# Patient Record
Sex: MALE | Race: WHITE | NOT HISPANIC OR LATINO | Employment: UNEMPLOYED | URBAN - NONMETROPOLITAN AREA
[De-identification: names, ages, dates, MRNs, and addresses within clinical notes are randomized per-mention and may not be internally consistent; named-entity substitution may affect disease eponyms.]

---

## 2017-03-13 ENCOUNTER — OFFICE VISIT (OUTPATIENT)
Dept: CARDIOLOGY | Facility: CLINIC | Age: 37
End: 2017-03-13

## 2017-03-13 VITALS
WEIGHT: 170 LBS | HEIGHT: 67 IN | SYSTOLIC BLOOD PRESSURE: 141 MMHG | OXYGEN SATURATION: 100 % | BODY MASS INDEX: 26.68 KG/M2 | HEART RATE: 41 BPM | DIASTOLIC BLOOD PRESSURE: 74 MMHG

## 2017-03-13 DIAGNOSIS — R94.31 ABNORMAL EKG: ICD-10-CM

## 2017-03-13 DIAGNOSIS — R06.02 SHORTNESS OF BREATH: Primary | ICD-10-CM

## 2017-03-13 DIAGNOSIS — R01.1 SYSTOLIC MURMUR: ICD-10-CM

## 2017-03-13 DIAGNOSIS — R01.1 HEART MURMUR: ICD-10-CM

## 2017-03-13 DIAGNOSIS — H02.539 LID LAG: ICD-10-CM

## 2017-03-13 DIAGNOSIS — R00.1 BRADYCARDIA: ICD-10-CM

## 2017-03-13 DIAGNOSIS — R42 DIZZINESS: ICD-10-CM

## 2017-03-13 PROCEDURE — 93000 ELECTROCARDIOGRAM COMPLETE: CPT | Performed by: INTERNAL MEDICINE

## 2017-03-13 PROCEDURE — 99204 OFFICE O/P NEW MOD 45 MIN: CPT | Performed by: INTERNAL MEDICINE

## 2017-03-13 NOTE — PROGRESS NOTES
"Subjective   Dimitri Natarajan is a 36 y.o. male     Chief Complaint   Patient presents with   • Slow Heart Rate     presents as a new patient       PROBLEM LIST:     1. Bradycardia   2. Dizziness   3. Heart murmur     Specialty Problems     None            HPI:  Mr. Dimitri Natarajan is a 36-year-old white male referred by Shannan Rose for evaluation of bradycardia.    Mr. Natarajan first noted bradycardia proximal knee 3 years ago.  At that time he began to restrict calories and lose weight.  When he first started exercising would palpitate when he would walk up hill and do other high levels of physical activity.  He is had no palpitations over the last year.  Mr. Natarajan works out at the gym 6 days a week and only occasionally notices dizziness.  This is usually associated with rapid movements and not necessarily with high-level aerobic exercise.  He has had no presyncope or syncope.  The patient does describe mild to moderate orthostatic lightheadedness.    Mr. Natarajan denies any type of chest discomfort.  He relates no orthopnea, PND, or edema.  He doesn't claudicate or describe other symptoms of peripheral arterial disease or arterial embolic events.  There are multiple members and the patient's family who have had abnormal rhythms but one has required a pacemaker.  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                 CURRENT MEDICATION:    No current outpatient prescriptions on file.     No current facility-administered medications for this visit.        ALLERGIES:    Review of patient's allergies indicates no known allergies.    PAST MEDICAL HISTORY:    Past Medical History   Diagnosis Date   • Heart murmur        SURGICAL HISTORY:    Past Surgical History   Procedure Laterality Date   • Rotator cuff repair     • Shoulder surgery         SOCIAL HISTORY:    Social History     Social History   • Marital status: Single     Spouse name: N/A   • Number " "of children: N/A   • Years of education: N/A     Occupational History   • Not on file.     Social History Main Topics   • Smoking status: Former Smoker   • Smokeless tobacco: Never Used   • Alcohol use No   • Drug use: No   • Sexual activity: Defer     Other Topics Concern   • Not on file     Social History Narrative   • No narrative on file       FAMILY HISTORY:    Family History   Problem Relation Age of Onset   • Heart disease Mother    • Heart disease Father        Review of Systems   Constitutional: Negative.  Negative for chills, diaphoresis, fatigue and fever.   HENT: Positive for ear pain and sinus pressure.    Eyes: Positive for visual disturbance (wears glasses).   Respiratory: Negative.  Negative for chest tightness and shortness of breath (rare no orthopnea or PND).    Cardiovascular: Negative.  Negative for chest pain, palpitations and leg swelling.   Gastrointestinal: Negative.  Negative for abdominal pain, blood in stool (no melena, no hematuria, no hematemesis, no hematochezia ), constipation, diarrhea, nausea and vomiting.   Endocrine: Negative.  Negative for cold intolerance and heat intolerance.   Genitourinary: Negative.    Musculoskeletal: Positive for arthralgias. Negative for myalgias.   Skin: Negative.    Allergic/Immunologic: Positive for environmental allergies.   Neurological: Positive for dizziness (with exercise movement , occasionally ) and headaches (with moderate activity, rare). Negative for tremors, seizures, syncope, facial asymmetry, speech difficulty, weakness, light-headedness and numbness.   Hematological: Negative.    Psychiatric/Behavioral: Negative.        VISIT VITALS:    Visit Vitals   • /74 (BP Location: Left arm, Patient Position: Sitting)   • Pulse (!) 41   • Ht 67\" (170.2 cm)   • Wt 170 lb (77.1 kg)   • SpO2 100%   • BMI 26.63 kg/m2       RECENT LABS:    Objective       Physical Exam   Constitutional: He is oriented to person, place, and time. He appears " well-developed and well-nourished. No distress.   HENT:   Head: Normocephalic and atraumatic.   Eyes: Conjunctivae, EOM and lids are normal. Pupils are equal, round, and reactive to light. Lids are everted and swept, no foreign bodies found.   Minor lid lag   Negative ptosis BL   Negative arcus senilis   Negative nystagmus      Neck: Normal range of motion. Neck supple. Normal carotid pulses, no hepatojugular reflux and no JVD present. Carotid bruit is not present. No tracheal deviation present. No thyroid mass and no thyromegaly present.   Cardiovascular:   Murmur heard.   Systolic (RUSB radiates to apex ) murmur is present with a grade of 2/6   Pulses:       Radial pulses are 2+ on the right side, and 2+ on the left side.        Dorsalis pedis pulses are 2+ on the right side, and 2+ on the left side.        Posterior tibial pulses are 2+ on the right side, and 2+ on the left side.   Pulmonary/Chest: Effort normal and breath sounds normal. No respiratory distress. He has no wheezes. He has no rales. He exhibits no tenderness.   Abdominal: Soft. Bowel sounds are normal. He exhibits no distension, no abdominal bruit and no mass. There is no tenderness.   Negative organomegaly      Musculoskeletal: Normal range of motion. He exhibits no edema.   Neurological: He is alert and oriented to person, place, and time. He has normal reflexes.   Skin: Skin is warm and dry. He is not diaphoretic.   Psychiatric: He has a normal mood and affect. His speech is normal and behavior is normal. Judgment and thought content normal. Cognition and memory are normal.   Nursing note and vitals reviewed.        ECG 12 Lead  Date/Time: 3/13/2017 11:05 AM  Performed by: KEL GERARDO  Authorized by: KEL GERARDO   Rhythm: sinus bradycardia  Rate: bradycardic  Conduction: 1st degree  QRS axis: normal  Comments: Chest pain  Palpitations  Shortness of breath                Assessment/Plan   #1.  Profound sinus bradycardia which is very  minimally symptomatic.  I wonder if this could be a manifestation of the patient's weight loss and reduced cardiac work load.  However, primary rhythm disturbance other potential causes need to be evaluated further.    #2.  Therefore, I would like to have the patient wear a 30 day event monitor to assess for excursions of bradycardia dysrhythmia.  We will also perform treadmill stress testing to evaluate the possibility of chronotropic incompetence.    #3.  Will perform echocardiography to assess the patient's murmur and history of septal defect.    #4.  I would like to get thyroid function studies based on the patient's bradycardia and first-degree AV block.    #5 sedition will follow-up with Jordon Rose as instructed, and in our office after testing her on a when necessary basis for symptoms as discussed.               Diagnosis Plan   1. Shortness of breath  ECG 12 Lead   2. Bradycardia  Adult Transthoracic Echo Complete    Treadmill Stress Test    Cardiac Event Monitor    Cardiac Event Monitor    Treadmill Stress Test    Adult Transthoracic Echo Complete   3. Lid lag  TSH    T3, Free    T4, Free    TSH    T3, Free    T4, Free   4. Abnormal EKG  Adult Transthoracic Echo Complete    Treadmill Stress Test    Cardiac Event Monitor    Cardiac Event Monitor    Treadmill Stress Test    Adult Transthoracic Echo Complete   5. Dizziness  Adult Transthoracic Echo Complete    Treadmill Stress Test    Treadmill Stress Test    Adult Transthoracic Echo Complete   6. Heart murmur  Adult Transthoracic Echo Complete    Treadmill Stress Test    Treadmill Stress Test    Adult Transthoracic Echo Complete   7. Systolic murmur  Adult Transthoracic Echo Complete    Treadmill Stress Test    Treadmill Stress Test    Adult Transthoracic Echo Complete       Return for f/u after stress, echo, event, labs .         Chin Che MD

## 2017-03-22 ENCOUNTER — OFFICE VISIT (OUTPATIENT)
Dept: CARDIOLOGY | Facility: CLINIC | Age: 37
End: 2017-03-22

## 2017-03-22 VITALS
BODY MASS INDEX: 25.43 KG/M2 | HEART RATE: 60 BPM | HEIGHT: 67 IN | WEIGHT: 162 LBS | OXYGEN SATURATION: 99 % | SYSTOLIC BLOOD PRESSURE: 108 MMHG | DIASTOLIC BLOOD PRESSURE: 71 MMHG

## 2017-03-22 DIAGNOSIS — R00.2 PALPITATIONS: ICD-10-CM

## 2017-03-22 DIAGNOSIS — R00.1 BRADYCARDIA: ICD-10-CM

## 2017-03-22 DIAGNOSIS — R06.02 SHORTNESS OF BREATH: Primary | ICD-10-CM

## 2017-03-22 PROCEDURE — 99213 OFFICE O/P EST LOW 20 MIN: CPT | Performed by: PHYSICIAN ASSISTANT

## 2017-03-22 RX ORDER — FLUTICASONE PROPIONATE 50 MCG
2 SPRAY, SUSPENSION (ML) NASAL DAILY
COMMUNITY

## 2017-03-22 NOTE — PROGRESS NOTES
Problem list     Subjective   Dimitri Natarajan is a 36 y.o. male     Chief Complaint   Patient presents with   • Follow-up     heart monitor       HPI    Problem list  1. Patient is a 36-year-old male that presents back to office for follow-up. He was seen last office visit by Dr. Che who schedule for an ischemia assessment as well as event monitoring. Patient was recently diagnosed with influenza. He did not perform stress testing or echocardiogram. However he is currently wearing event monitoring and is here today to follow-up because of a serious report per the company.    He has been feeling well. He denies any chest pain or pressure. He has mild shortness of breath without levels of activity but nothing that has been progressive. He denies PND orthopnea. He has had palpitations in the past but nothing recent. He denies dizziness, presyncope, or syncope. Otherwise states is doing well    Event monitor shows episodes of sinus bradycardia in the 30s even during daytime hours but patient was not symptomatic in any regard.        Outpatient Encounter Prescriptions as of 3/22/2017   Medication Sig Dispense Refill   • fluticasone (FLONASE) 50 MCG/ACT nasal spray 2 sprays into each nostril Daily.     • Oseltamivir Phosphate (TAMIFLU PO) Take  by mouth. Until completed       No facility-administered encounter medications on file as of 3/22/2017.        Review of patient's allergies indicates no known allergies.    Past Medical History:   Diagnosis Date   • Heart murmur        Social History     Social History   • Marital status: Single     Spouse name: N/A   • Number of children: N/A   • Years of education: N/A     Occupational History   • Not on file.     Social History Main Topics   • Smoking status: Former Smoker   • Smokeless tobacco: Never Used   • Alcohol use No   • Drug use: No   • Sexual activity: Defer     Other Topics Concern   • Not on file     Social History Narrative       Family History   Problem  "Relation Age of Onset   • Heart disease Mother    • Heart disease Father        Review of Systems   Constitutional: Positive for fatigue.   HENT: Positive for congestion (nasal congestion) and postnasal drip.         Currently has flu   Eyes: Positive for visual disturbance (wears glasses).   Respiratory: Positive for shortness of breath.    Cardiovascular: Negative.    Gastrointestinal: Negative.    Endocrine: Negative.    Genitourinary: Negative.    Musculoskeletal: Positive for arthralgias and myalgias.   Skin: Negative.    Allergic/Immunologic: Negative.    Neurological: Positive for dizziness (currently with flu).   Hematological: Negative.    Psychiatric/Behavioral: Negative.        Objective     /71 (BP Location: Left arm, Patient Position: Sitting)  Pulse 60  Ht 67\" (170.2 cm)  Wt 162 lb (73.5 kg)  SpO2 99%  BMI 25.37 kg/m2    Lab Results (most recent)     None          Physical Exam   Constitutional: He is oriented to person, place, and time. He appears well-developed and well-nourished. No distress.   HENT:   Head: Normocephalic and atraumatic.   Eyes: EOM are normal. Pupils are equal, round, and reactive to light.   Neck: No JVD present.   Cardiovascular: Normal rate, regular rhythm and normal heart sounds.  Exam reveals no gallop and no friction rub.    No murmur heard.  Pulmonary/Chest: Effort normal and breath sounds normal. No respiratory distress. He has no wheezes. He has no rales. He exhibits no tenderness.   Musculoskeletal: Normal range of motion. He exhibits no edema.   Neurological: He is alert and oriented to person, place, and time. No cranial nerve deficit.   Skin: Skin is warm and dry. No rash noted. No erythema. No pallor.   Psychiatric: He has a normal mood and affect. His behavior is normal.   Nursing note and vitals reviewed.      Procedure   Procedures       Assessment/Plan     Problems Addressed this Visit        Cardiovascular and Mediastinum    Bradycardia    Palpitations "       Respiratory    Shortness of breath - Primary               recommendations  1. Patient to follow through with stress testing and echocardiogram for Dr. Che. We will continue to monitor clinically and patient will continue to wear an event monitor. As of now, he exhibits no symptoms to suggest cerebral hypoperfusion. We will see him back for follow-up after above testing. Follow-up primary as scheduled

## 2017-03-29 ENCOUNTER — HOSPITAL ENCOUNTER (OUTPATIENT)
Dept: CARDIOLOGY | Facility: HOSPITAL | Age: 37
Discharge: HOME | End: 2017-03-29
Attending: INTERNAL MEDICINE | Admitting: INTERNAL MEDICINE

## 2017-03-29 ENCOUNTER — OUTSIDE FACILITY SERVICE (OUTPATIENT)
Dept: CARDIOLOGY | Facility: CLINIC | Age: 37
End: 2017-03-29

## 2017-03-29 ENCOUNTER — HOSPITAL ENCOUNTER (OUTPATIENT)
Dept: CARDIOLOGY | Facility: HOSPITAL | Age: 37
Discharge: HOME | End: 2017-03-29
Attending: INTERNAL MEDICINE

## 2017-03-29 LAB
MAXIMAL PREDICTED HEART RATE: 182 BPM
STRESS TARGET HR: 155 BPM

## 2017-03-29 PROCEDURE — 93017 CV STRESS TEST TRACING ONLY: CPT

## 2017-03-29 PROCEDURE — 93306 TTE W/DOPPLER COMPLETE: CPT | Performed by: INTERNAL MEDICINE

## 2017-03-29 PROCEDURE — 93306 TTE W/DOPPLER COMPLETE: CPT

## 2017-03-29 PROCEDURE — 93018 CV STRESS TEST I&R ONLY: CPT | Performed by: INTERNAL MEDICINE

## 2017-04-13 ENCOUNTER — DOCUMENTATION (OUTPATIENT)
Dept: CARDIOLOGY | Facility: CLINIC | Age: 37
End: 2017-04-13

## 2017-07-03 ENCOUNTER — OFFICE VISIT (OUTPATIENT)
Dept: CARDIOLOGY | Facility: CLINIC | Age: 37
End: 2017-07-03

## 2017-07-03 VITALS
OXYGEN SATURATION: 99 % | HEIGHT: 67 IN | SYSTOLIC BLOOD PRESSURE: 133 MMHG | DIASTOLIC BLOOD PRESSURE: 75 MMHG | BODY MASS INDEX: 26.59 KG/M2 | WEIGHT: 169.4 LBS | HEART RATE: 43 BPM

## 2017-07-03 DIAGNOSIS — R06.02 SHORTNESS OF BREATH: Primary | ICD-10-CM

## 2017-07-03 DIAGNOSIS — R00.1 BRADYCARDIA: ICD-10-CM

## 2017-07-03 DIAGNOSIS — I36.1 NON-RHEUMATIC TRICUSPID VALVE INSUFFICIENCY: ICD-10-CM

## 2017-07-03 PROCEDURE — 99213 OFFICE O/P EST LOW 20 MIN: CPT | Performed by: PHYSICIAN ASSISTANT

## 2017-07-03 RX ORDER — IBUPROFEN 800 MG/1
TABLET ORAL AS NEEDED
COMMUNITY
Start: 2017-06-10

## 2017-07-03 NOTE — PROGRESS NOTES
Problem list     Subjective   Dimitri Natarajan is a 36 y.o. male     Chief Complaint   Patient presents with   • Follow-up     patient appears inoffice today for follow up with stress test results       HPI    Patient is a 36-year-old male that presents back for follow-up. He has done remarkably well. He denies chest pain or pressure. This very minimal dyspnea with high levels of activity but none at baseline. Denies PND orthopnea. He does not palpitate or dysrhythmic symptoms. Patient is quite active. Patient has lost approximately 150 pounds and is very active lifting weights at this time with no symptoms.    Stress test indicated patient exercising 9 minutes on a Pablo protocol with no symptoms or EKG changes and was considered low risk    Echocardiogram was largely unremarkable. Normal systolic function, mild tricuspid regurgitation otherwise normal      Outpatient Encounter Prescriptions as of 7/3/2017   Medication Sig Dispense Refill   • fluticasone (FLONASE) 50 MCG/ACT nasal spray 2 sprays into each nostril Daily.     • ibuprofen (ADVIL,MOTRIN) 800 MG tablet Take As Directed.     • Oseltamivir Phosphate (TAMIFLU PO) Take  by mouth. Until completed       No facility-administered encounter medications on file as of 7/3/2017.        Review of patient's allergies indicates no known allergies.    Past Medical History:   Diagnosis Date   • Heart murmur        Social History     Social History   • Marital status: Single     Spouse name: N/A   • Number of children: N/A   • Years of education: N/A     Occupational History   • Not on file.     Social History Main Topics   • Smoking status: Former Smoker   • Smokeless tobacco: Never Used   • Alcohol use No   • Drug use: No   • Sexual activity: Defer     Other Topics Concern   • Not on file     Social History Narrative       Family History   Problem Relation Age of Onset   • Heart disease Mother    • Heart disease Father        Review of Systems   Constitutional: Negative.  " Negative for fatigue.   HENT: Negative.    Eyes: Positive for visual disturbance (wears glasses daily).   Respiratory: Negative.  Negative for cough, chest tightness, shortness of breath and wheezing.    Cardiovascular: Negative.  Negative for chest pain, palpitations and leg swelling.   Gastrointestinal: Negative.  Negative for abdominal pain, nausea and vomiting.   Endocrine: Negative.  Negative for cold intolerance, heat intolerance, polyphagia and polyuria.   Genitourinary: Negative.  Negative for difficulty urinating, frequency and urgency.   Musculoskeletal: Positive for arthralgias (shoulders/back) and back pain (due to arthritis). Negative for myalgias, neck pain and neck stiffness.   Skin: Negative.  Negative for rash and wound.   Allergic/Immunologic: Negative.  Negative for environmental allergies and food allergies.   Neurological: Negative.  Negative for dizziness, weakness, light-headedness and headaches.   Hematological: Negative.  Does not bruise/bleed easily.   Psychiatric/Behavioral: Negative.  Negative for agitation and confusion. The patient is not nervous/anxious.        Objective     /75 (BP Location: Left arm, Patient Position: Sitting)  Pulse (!) 43  Ht 67\" (170.2 cm)  Wt 169 lb 6.4 oz (76.8 kg)  SpO2 99%  BMI 26.53 kg/m2    Lab Results (most recent)     None          Physical Exam    Procedure   Procedures       Assessment/Plan     Problems Addressed this Visit        Cardiovascular and Mediastinum    Bradycardia    Non-rheumatic tricuspid valve insufficiency       Respiratory    Shortness of breath - Primary              Recommendation  1. Patient doing well at this time. Although event monitor demonstrates bradycardia patient is not symptomatic and is quite active. Stress and echo unremarkable. We will see back follow-up in one year or sooner symptoms discussed. Follow-up primary as scheduled     "

## 2020-01-09 ENCOUNTER — OFFICE VISIT (OUTPATIENT)
Dept: CARDIOLOGY | Facility: CLINIC | Age: 40
End: 2020-01-09

## 2020-01-09 VITALS
HEART RATE: 60 BPM | OXYGEN SATURATION: 99 % | SYSTOLIC BLOOD PRESSURE: 137 MMHG | BODY MASS INDEX: 28.25 KG/M2 | WEIGHT: 180 LBS | DIASTOLIC BLOOD PRESSURE: 81 MMHG | HEIGHT: 67 IN

## 2020-01-09 DIAGNOSIS — R20.0 NUMBNESS: Primary | ICD-10-CM

## 2020-01-09 DIAGNOSIS — R53.1 WEAKNESS: ICD-10-CM

## 2020-01-09 DIAGNOSIS — R00.1 BRADYCARDIA, SINUS: ICD-10-CM

## 2020-01-09 DIAGNOSIS — R09.89 CAROTID BRUIT, UNSPECIFIED LATERALITY: ICD-10-CM

## 2020-01-09 PROCEDURE — 99214 OFFICE O/P EST MOD 30 MIN: CPT | Performed by: PHYSICIAN ASSISTANT

## 2020-01-09 PROCEDURE — 93000 ELECTROCARDIOGRAM COMPLETE: CPT | Performed by: PHYSICIAN ASSISTANT

## 2020-01-09 RX ORDER — CETIRIZINE HYDROCHLORIDE 10 MG/1
10 TABLET ORAL DAILY
COMMUNITY

## 2020-01-09 NOTE — PROGRESS NOTES
Problem list     Subjective   Dimitri Natarajan is a 39 y.o. male     Chief Complaint   Patient presents with   • Numbness     Here for a follow up       HPI    Patient is a 39-year-old male who presents back to the office for follow-up.  We had seen him last in 2017.  He underwent cardiac evaluation including stress test and echo which was normal.    Recently he started having symptoms of numbness.  Patient describes that he was sitting and noticed numbness in his face and head.  Patient began to lean forward and eventually resolved.  He was concerned about possible stroke.  Patient has spinal issues and has had numbness in his arms and legs before related to radiculopathy but he was concerned and his chiropractor recommended evaluation.    He does not describe chest pain.  He has no shortness of breath.  No PND orthopnea.    He does have baseline bradycardia.  This is demonstrated today but otherwise he is doing well.      Current Outpatient Medications on File Prior to Visit   Medication Sig Dispense Refill   • cetirizine (zyrTEC) 10 MG tablet Take 10 mg by mouth Daily.     • fluticasone (FLONASE) 50 MCG/ACT nasal spray 2 sprays into each nostril Daily.     • ibuprofen (ADVIL,MOTRIN) 800 MG tablet Take As Directed.     • Oseltamivir Phosphate (TAMIFLU PO) Take  by mouth. Until completed       No current facility-administered medications on file prior to visit.        Patient has no known allergies.    Past Medical History:   Diagnosis Date   • Heart murmur        Social History     Socioeconomic History   • Marital status: Single     Spouse name: Not on file   • Number of children: Not on file   • Years of education: Not on file   • Highest education level: Not on file   Tobacco Use   • Smoking status: Former Smoker   • Smokeless tobacco: Never Used   Substance and Sexual Activity   • Alcohol use: No   • Drug use: No   • Sexual activity: Defer       Family History   Problem Relation Age of Onset   • Heart disease  "Mother    • Heart disease Father        Review of Systems   Constitutional: Positive for fatigue. Negative for chills and fever.   HENT: Positive for congestion (stuffy nose ).    Eyes: Positive for visual disturbance (glasses daily ).   Respiratory: Negative.  Negative for chest tightness and shortness of breath.    Cardiovascular: Negative.  Negative for chest pain, palpitations and leg swelling.   Gastrointestinal: Negative.    Endocrine: Negative.  Negative for cold intolerance and heat intolerance.   Genitourinary: Negative.    Musculoskeletal: Negative.  Negative for arthralgias and back pain.   Skin: Negative.  Negative for rash and wound.   Allergic/Immunologic: Negative.  Negative for environmental allergies and food allergies.   Neurological: Positive for light-headedness (when standing too fast ) and numbness (some numbness and tingling in left side of face a couple of weeks ago. Nothing since then ). Negative for dizziness and weakness.        Feels like he is in a fog at times recently    Hematological: Negative.  Does not bruise/bleed easily.   Psychiatric/Behavioral: Negative.  Negative for sleep disturbance (denies waking with smothering ).   All other systems reviewed and are negative.      Objective   Vitals:    01/09/20 1307   BP: 137/81   BP Location: Left arm   Patient Position: Sitting   Pulse: 60   SpO2: 99%   Weight: 81.6 kg (180 lb)   Height: 170.2 cm (67\")      /81 (BP Location: Left arm, Patient Position: Sitting)   Pulse 60   Ht 170.2 cm (67\")   Wt 81.6 kg (180 lb)   SpO2 99%   BMI 28.19 kg/m²     Lab Results (most recent)     None          Physical Exam   Constitutional: He is oriented to person, place, and time. He appears well-developed and well-nourished. No distress.   HENT:   Head: Normocephalic and atraumatic.   Eyes: Pupils are equal, round, and reactive to light. EOM are normal.   Neck: No JVD present.   Cardiovascular: Normal rate, regular rhythm, normal heart sounds " and intact distal pulses. Exam reveals no gallop and no friction rub.   No murmur heard.  Pulmonary/Chest: Effort normal and breath sounds normal. No respiratory distress. He has no wheezes. He has no rales. He exhibits no tenderness.   Musculoskeletal: Normal range of motion. He exhibits no edema.   Neurological: He is alert and oriented to person, place, and time. No cranial nerve deficit.   Skin: Skin is warm and dry. No rash noted. No erythema. No pallor.   Psychiatric: He has a normal mood and affect. His behavior is normal.   Nursing note and vitals reviewed.      Procedure     ECG 12 Lead  Date/Time: 1/9/2020 1:16 PM  Performed by: Preston Farley PA  Authorized by: Preston Farley PA   Comparison: compared with previous ECG from 3/13/2017  Comments: EKG is sinus rhythm at 54 bpm with no acute ST changes               Assessment/Plan     Problems Addressed this Visit        Cardiovascular and Mediastinum    Bradycardia, sinus    Relevant Orders    Cardiac Event Monitor       Nervous and Auditory    Numbness - Primary    Relevant Orders    ECG 12 Lead    Cardiac Event Monitor       Other    Weakness    Relevant Orders    Cardiac Event Monitor      Other Visit Diagnoses     Carotid bruit, unspecified laterality        Relevant Orders    Duplex Carotid Ultrasound CAR    Cardiac Event Monitor            Recommendation  1.  Patient doing well at this time.  No symptoms of angina or failure.  2.  Patient with bradycardia and numbness on the left side of his face.  I will schedule event monitor to rule out any arrhythmic substrate.  3.  Carotid duplex just to ensure no abnormality.  We will see him back for follow-up after testing.  Will follow with primary as scheduled       Patient's Body mass index is 28.19 kg/m². BMI is within normal parameters. No follow-up required..       Electronically signed by:

## 2020-01-15 ENCOUNTER — HOSPITAL ENCOUNTER (OUTPATIENT)
Dept: CARDIOLOGY | Facility: HOSPITAL | Age: 40
Discharge: HOME | End: 2020-01-15
Admitting: PHYSICIAN ASSISTANT

## 2020-01-15 DIAGNOSIS — R09.89 CAROTID BRUIT, UNSPECIFIED LATERALITY: ICD-10-CM

## 2020-01-15 PROCEDURE — 93880 EXTRACRANIAL BILAT STUDY: CPT | Performed by: INTERNAL MEDICINE

## 2020-01-15 PROCEDURE — 93880 EXTRACRANIAL BILAT STUDY: CPT

## 2020-01-27 ENCOUNTER — TELEPHONE (OUTPATIENT)
Dept: CARDIOLOGY | Facility: CLINIC | Age: 40
End: 2020-01-27

## 2020-01-27 LAB
BH CV ECHO MEAS - BSA(HAYCOCK): 2 M^2
BH CV ECHO MEAS - BSA: 1.9 M^2
BH CV ECHO MEAS - BZI_BMI: 28.2 KILOGRAMS/M^2
BH CV ECHO MEAS - BZI_METRIC_HEIGHT: 170.2 CM
BH CV ECHO MEAS - BZI_METRIC_WEIGHT: 81.6 KG
BH CV XLRA MEAS LEFT BULB EDV: -11.7 CM/SEC
BH CV XLRA MEAS LEFT BULB PSV: -57.1 CM/SEC
BH CV XLRA MEAS LEFT CCA RATIO VEL: 109 CM/SEC
BH CV XLRA MEAS LEFT DIST CCA EDV: 22 CM/SEC
BH CV XLRA MEAS LEFT DIST CCA PSV: 109 CM/SEC
BH CV XLRA MEAS LEFT DIST ICA EDV: -57.3 CM/SEC
BH CV XLRA MEAS LEFT DIST ICA PSV: -139 CM/SEC
BH CV XLRA MEAS LEFT ICA RATIO VEL: -138 CM/SEC
BH CV XLRA MEAS LEFT ICA/CCA RATIO: -1.3
BH CV XLRA MEAS LEFT MID ICA EDV: -64.2 CM/SEC
BH CV XLRA MEAS LEFT MID ICA PSV: -114 CM/SEC
BH CV XLRA MEAS LEFT PROX CCA EDV: 24.1 CM/SEC
BH CV XLRA MEAS LEFT PROX CCA PSV: 135 CM/SEC
BH CV XLRA MEAS LEFT PROX ECA EDV: -11.3 CM/SEC
BH CV XLRA MEAS LEFT PROX ECA PSV: -72.2 CM/SEC
BH CV XLRA MEAS LEFT PROX ICA EDV: -24.1 CM/SEC
BH CV XLRA MEAS LEFT PROX ICA PSV: -109 CM/SEC
BH CV XLRA MEAS LEFT VERTEBRAL A EDV: 10.6 CM/SEC
BH CV XLRA MEAS LEFT VERTEBRAL A PSV: 31 CM/SEC
BH CV XLRA MEAS RIGHT BULB EDV: -14.4 CM/SEC
BH CV XLRA MEAS RIGHT BULB PSV: -83.9 CM/SEC
BH CV XLRA MEAS RIGHT CCA RATIO VEL: 112 CM/SEC
BH CV XLRA MEAS RIGHT DIST CCA EDV: 21.3 CM/SEC
BH CV XLRA MEAS RIGHT DIST CCA PSV: 112 CM/SEC
BH CV XLRA MEAS RIGHT DIST ICA EDV: -41.5 CM/SEC
BH CV XLRA MEAS RIGHT DIST ICA PSV: -79.8 CM/SEC
BH CV XLRA MEAS RIGHT ICA RATIO VEL: -107 CM/SEC
BH CV XLRA MEAS RIGHT ICA/CCA RATIO: -0.96
BH CV XLRA MEAS RIGHT MID ICA EDV: -47.8 CM/SEC
BH CV XLRA MEAS RIGHT MID ICA PSV: -107.5 CM/SEC
BH CV XLRA MEAS RIGHT PROX CCA EDV: 21.5 CM/SEC
BH CV XLRA MEAS RIGHT PROX CCA PSV: 138 CM/SEC
BH CV XLRA MEAS RIGHT PROX ECA EDV: -8.3 CM/SEC
BH CV XLRA MEAS RIGHT PROX ECA PSV: -76.3 CM/SEC
BH CV XLRA MEAS RIGHT PROX ICA EDV: -30.3 CM/SEC
BH CV XLRA MEAS RIGHT PROX ICA PSV: -102 CM/SEC
BH CV XLRA MEAS RIGHT VERTEBRAL A EDV: 15.8 CM/SEC
BH CV XLRA MEAS RIGHT VERTEBRAL A PSV: 65.4 CM/SEC

## 2020-01-27 NOTE — TELEPHONE ENCOUNTER
Patient made aware of carotid US results.  Discussed provider recommendations.  Patient verbalizes understanding and is agreeable.         ----- Message from GABO Hernandez sent at 1/27/2020  8:53 AM EST -----  Routine follow up

## 2020-04-09 ENCOUNTER — OFFICE VISIT (OUTPATIENT)
Dept: CARDIOLOGY | Facility: CLINIC | Age: 40
End: 2020-04-09

## 2020-04-09 DIAGNOSIS — R00.1 BRADYCARDIA, SINUS: Primary | ICD-10-CM

## 2020-04-09 DIAGNOSIS — I65.23 BILATERAL CAROTID ARTERY STENOSIS: ICD-10-CM

## 2020-04-09 DIAGNOSIS — R42 DIZZINESS: ICD-10-CM

## 2020-04-09 PROCEDURE — 99441 PR PHYS/QHP TELEPHONE EVALUATION 5-10 MIN: CPT | Performed by: PHYSICIAN ASSISTANT

## 2020-04-09 RX ORDER — ATORVASTATIN CALCIUM 20 MG/1
20 TABLET, FILM COATED ORAL DAILY
Qty: 30 TABLET | Refills: 11 | Status: SHIPPED | OUTPATIENT
Start: 2020-04-09 | End: 2021-10-12

## 2020-04-09 NOTE — PROGRESS NOTES
You have chosen to receive care through a telephone visit today. Do you consent to use a telephone visit for your medical care today? Yes    Problem list     Subjective   Dimitri Natarajan is a 39 y.o. male     Chief Complaint   Patient presents with   • Shortness of Breath     televisit for carotid and monitor f/u       HPI    Patient is a 39-year-old male that is being interviewed telephonically due to the recent global pandemic.    He has done well.  Patient is to review event monitor carotid duplex.  Carotid duplex findings suggest 16 to 49% bilateral stenosis but no obstructive disease and antegrade vertebral flow.  Event monitor showed sinus rhythm with occasional sinus bradycardia and bradycardia in the 30s with occasional junctional rhythm at night.    He has done well.  Patient is quite active.  Patient describes losing weight from 300 pounds to approximately 180 and over a 4-year period from increased exercise and diet control.  He has done well and feeling great.    He has no chest pain or pressure.  No shortness of breath.  No PND orthopnea.    He does not palpitate.  He does not describe any dizziness presyncope or syncope.  He did have dizziness with a sinus infection but that has improved.  Otherwise is doing well      Current Outpatient Medications on File Prior to Visit   Medication Sig Dispense Refill   • cetirizine (zyrTEC) 10 MG tablet Take 10 mg by mouth Daily.     • fluticasone (FLONASE) 50 MCG/ACT nasal spray 2 sprays into each nostril Daily.     • ibuprofen (ADVIL,MOTRIN) 800 MG tablet Take As Directed.     • [DISCONTINUED] Oseltamivir Phosphate (TAMIFLU PO) Take  by mouth. Until completed       No current facility-administered medications on file prior to visit.        Patient has no known allergies.    Past Medical History:   Diagnosis Date   • Heart murmur        Social History     Socioeconomic History   • Marital status: Single     Spouse name: Not on file   • Number of children: Not on  file   • Years of education: Not on file   • Highest education level: Not on file   Tobacco Use   • Smoking status: Former Smoker   • Smokeless tobacco: Never Used   Substance and Sexual Activity   • Alcohol use: No   • Drug use: No   • Sexual activity: Defer       Family History   Problem Relation Age of Onset   • Heart disease Mother    • Heart disease Father        Review of Systems   Constitutional: Negative.    HENT: Negative.         Sinus issues   Eyes: Positive for visual disturbance.   Respiratory: Negative for shortness of breath (on exertion).    Cardiovascular: Negative.  Negative for chest pain, palpitations and leg swelling.   Gastrointestinal: Negative.    Endocrine: Negative.    Genitourinary: Negative.    Musculoskeletal: Positive for arthralgias and back pain.   Skin: Negative.    Allergic/Immunologic: Positive for environmental allergies.   Neurological: Negative.    Hematological: Negative.    Psychiatric/Behavioral: Negative.    All other systems reviewed and are negative.      Objective   There were no vitals filed for this visit.   There were no vitals taken for this visit.    Lab Results (most recent)     None          Procedure   Procedures       Assessment/Plan     Problems Addressed this Visit        Cardiovascular and Mediastinum    Bradycardia, sinus - Primary    Bilateral carotid artery stenosis       Other    Dizziness            Recommendation  1.  Patient was sinus bradycardia at night but doing well.  He is in excellent physical condition some of his bradycardia may be suggestive of good conditioning.  He has no symptoms of cerebral hypoperfusion    2.  Bilateral carotid artery stenosis that is mild.  I am sending in cholesterol medication to help even if his cholesterol levels are normal and I have discussed that with him.  We will consider repeat lipid parameters    3.  Patient with dizziness at times but that has improved after resolution of sinus infection.  For now we will see  him back in a year.  Will follow with primary scheduled            This visit has been rescheduled as a phone visit to comply with patient safety concerns in accordance with CDC recommendations. Total time of discussion was 6 minutes.    Electronically signed by:

## 2020-10-12 ENCOUNTER — OFFICE VISIT (OUTPATIENT)
Dept: CARDIOLOGY | Facility: CLINIC | Age: 40
End: 2020-10-12

## 2020-10-12 VITALS
OXYGEN SATURATION: 97 % | SYSTOLIC BLOOD PRESSURE: 127 MMHG | DIASTOLIC BLOOD PRESSURE: 77 MMHG | WEIGHT: 161.8 LBS | BODY MASS INDEX: 25.39 KG/M2 | HEART RATE: 52 BPM | HEIGHT: 67 IN

## 2020-10-12 DIAGNOSIS — I36.1 NONRHEUMATIC TRICUSPID VALVE REGURGITATION: ICD-10-CM

## 2020-10-12 DIAGNOSIS — R00.1 BRADYCARDIA, SINUS: Primary | ICD-10-CM

## 2020-10-12 DIAGNOSIS — E78.5 DYSLIPIDEMIA: ICD-10-CM

## 2020-10-12 PROCEDURE — 99213 OFFICE O/P EST LOW 20 MIN: CPT | Performed by: PHYSICIAN ASSISTANT

## 2020-10-12 NOTE — PROGRESS NOTES
Problem list     Subjective   Dimitri Natarajan is a 39 y.o. male     Chief Complaint   Patient presents with   • Follow-up       HPI    Patient is a 39-year-old male that presents back to the office for routine follow-up.  Patient is done remarkably well.  Patient has been quite active.  He has lost over 100 pounds been active doing cardiovascular exercise and he feels remarkably well.  He is doing remarkably well.    He does not describe chest pain or pressure.  No shortness of breath.  No PND orthopnea.  Patient does palpitations on occasion.  No symptoms of cerebral embolic events.  No dizziness presyncope or syncope.  Otherwise patient is doing well      Current Outpatient Medications on File Prior to Visit   Medication Sig Dispense Refill   • cetirizine (zyrTEC) 10 MG tablet Take 10 mg by mouth Daily.     • fluticasone (FLONASE) 50 MCG/ACT nasal spray 2 sprays into each nostril Daily.     • ibuprofen (ADVIL,MOTRIN) 800 MG tablet Take As Directed.     • atorvastatin (LIPITOR) 20 MG tablet Take 1 tablet by mouth Daily. 30 tablet 11     No current facility-administered medications on file prior to visit.        Patient has no known allergies.    Past Medical History:   Diagnosis Date   • Heart murmur    • OA (osteoarthritis)        Social History     Socioeconomic History   • Marital status: Single     Spouse name: Not on file   • Number of children: Not on file   • Years of education: Not on file   • Highest education level: Not on file   Tobacco Use   • Smoking status: Former Smoker   • Smokeless tobacco: Never Used   Substance and Sexual Activity   • Alcohol use: No   • Drug use: No   • Sexual activity: Defer       Family History   Problem Relation Age of Onset   • Heart disease Mother    • Heart disease Father        Review of Systems   Constitutional: Positive for fatigue.   HENT: Negative.    Eyes: Positive for visual disturbance.   Respiratory: Negative for shortness of breath.    Cardiovascular: Positive for  "palpitations (murmur). Negative for chest pain and leg swelling.   Musculoskeletal: Positive for arthralgias and back pain.   Skin: Positive for wound (finger). Negative for rash.   Allergic/Immunologic: Positive for environmental allergies. Negative for food allergies.   Hematological: Negative.    Psychiatric/Behavioral: Positive for sleep disturbance (Off and on sleep issues, denies SoA at HS ).       Objective   Vitals:    10/12/20 1254   BP: 127/77   Pulse: 52   SpO2: 97%   Weight: 73.4 kg (161 lb 12.8 oz)   Height: 170.2 cm (67\")      /77   Pulse 52   Ht 170.2 cm (67\")   Wt 73.4 kg (161 lb 12.8 oz)   SpO2 97%   BMI 25.34 kg/m²     Lab Results (most recent)     None          Physical Exam  Vitals signs and nursing note reviewed.   Constitutional:       General: He is not in acute distress.     Appearance: Normal appearance. He is well-developed.   HENT:      Head: Normocephalic and atraumatic.   Eyes:      General: No scleral icterus.        Right eye: No discharge.         Left eye: No discharge.      Conjunctiva/sclera: Conjunctivae normal.   Neck:      Vascular: No carotid bruit.   Cardiovascular:      Rate and Rhythm: Normal rate and regular rhythm.      Heart sounds: Normal heart sounds. No murmur. No friction rub. No gallop.    Pulmonary:      Effort: Pulmonary effort is normal. No respiratory distress.      Breath sounds: Normal breath sounds. No wheezing or rales.   Chest:      Chest wall: No tenderness.   Musculoskeletal:      Right lower leg: No edema.      Left lower leg: No edema.   Skin:     General: Skin is warm and dry.      Coloration: Skin is not pale.      Findings: No erythema or rash.   Neurological:      Mental Status: He is alert and oriented to person, place, and time.      Cranial Nerves: No cranial nerve deficit.   Psychiatric:         Behavior: Behavior normal.         Procedure   Procedures       Assessment/Plan     Problems Addressed this Visit        Cardiovascular and " Mediastinum    Nonrheumatic tricuspid valve regurgitation    Bradycardia, sinus - Primary       Other    Dyslipidemia      Diagnoses       Codes Comments    Bradycardia, sinus    -  Primary ICD-10-CM: R00.1  ICD-9-CM: 427.89     Nonrheumatic tricuspid valve regurgitation     ICD-10-CM: I36.1  ICD-9-CM: 424.2     Dyslipidemia     ICD-10-CM: E78.5  ICD-9-CM: 272.4           Recommendation  1.  Patient with bradycardia but excellent functional status.  I feel this is likely physiologic.  He has no symptoms of cerebral hypoperfusion.  For now we will continue    2.  Mild tragus regurgitation.  Otherwise he is doing well quite well.  Nothing further from that standpoint    3.  Patient with dyslipidemia on statin therapy.  Otherwise patient is doing well we will see him back for follow-up in a year.  Follow-up with primary as scheduled           Dimitri Rosa Isela  reports that he has quit smoking. He has never used smokeless tobacco.     Patient's Body mass index is 25.34 kg/m². BMI is within normal limits.          Electronically signed by:

## 2020-10-12 NOTE — PATIENT INSTRUCTIONS

## 2021-10-12 ENCOUNTER — OFFICE VISIT (OUTPATIENT)
Dept: CARDIOLOGY | Facility: CLINIC | Age: 41
End: 2021-10-12

## 2021-10-12 VITALS
SYSTOLIC BLOOD PRESSURE: 127 MMHG | HEART RATE: 58 BPM | BODY MASS INDEX: 26.68 KG/M2 | WEIGHT: 170 LBS | OXYGEN SATURATION: 100 % | HEIGHT: 67 IN | DIASTOLIC BLOOD PRESSURE: 80 MMHG

## 2021-10-12 DIAGNOSIS — R00.1 BRADYCARDIA, SINUS: Primary | ICD-10-CM

## 2021-10-12 DIAGNOSIS — I36.1 NONRHEUMATIC TRICUSPID VALVE REGURGITATION: ICD-10-CM

## 2021-10-12 PROBLEM — M25.569 KNEE PAIN: Status: ACTIVE | Noted: 2021-03-22

## 2021-10-12 PROBLEM — M47.816 LUMBAR SPONDYLOSIS: Status: ACTIVE | Noted: 2021-03-22

## 2021-10-12 PROCEDURE — 93000 ELECTROCARDIOGRAM COMPLETE: CPT | Performed by: PHYSICIAN ASSISTANT

## 2021-10-12 PROCEDURE — 99213 OFFICE O/P EST LOW 20 MIN: CPT | Performed by: PHYSICIAN ASSISTANT

## 2021-10-12 RX ORDER — HYDROCODONE BITARTRATE AND ACETAMINOPHEN 5; 325 MG/1; MG/1
1 TABLET ORAL 4 TIMES DAILY PRN
COMMUNITY

## 2021-10-12 RX ORDER — METHOCARBAMOL 500 MG/1
750 TABLET, FILM COATED ORAL 4 TIMES DAILY
COMMUNITY

## 2021-10-12 RX ORDER — GABAPENTIN 100 MG/1
100 CAPSULE ORAL 3 TIMES DAILY PRN
COMMUNITY

## 2021-10-12 NOTE — PATIENT INSTRUCTIONS

## 2021-10-12 NOTE — PROGRESS NOTES
Problem list     Subjective   Dimitri Natarajan is a 40 y.o. male     Chief Complaint   Patient presents with   • Yearly follow up   • Slow Heart Rate       HPI    Patient is a 40-year-old male who presents back to the office for follow-up.  He has done remarkably well since his last visit.  Patient has lost approximately 150 pounds by calorie control and by working out at the gym.  We applaud his efforts    He has no chest pain or pressure.  No shortness of breath.  No PND orthopnea.    He does not complain of palpitating having dysrhythmic symptoms.  Otherwise is doing well    Current Outpatient Medications on File Prior to Visit   Medication Sig Dispense Refill   • cetirizine (zyrTEC) 10 MG tablet Take 10 mg by mouth Daily.     • fluticasone (FLONASE) 50 MCG/ACT nasal spray 2 sprays into each nostril Daily.     • gabapentin (NEURONTIN) 100 MG capsule Take 100 mg by mouth 3 (Three) Times a Day As Needed.     • HYDROcodone-acetaminophen (NORCO) 5-325 MG per tablet Take 1 tablet by mouth 4 (Four) Times a Day As Needed.     • ibuprofen (ADVIL,MOTRIN) 800 MG tablet As Needed.     • methocarbamol (ROBAXIN) 500 MG tablet Take 500 mg by mouth 3 (Three) Times a Day As Needed for Muscle Spasms.     • [DISCONTINUED] atorvastatin (LIPITOR) 20 MG tablet Take 1 tablet by mouth Daily. 30 tablet 11   • [DISCONTINUED] Diclofenac Sodium (VOLTAREN) 1 % gel gel        No current facility-administered medications on file prior to visit.       Patient has no known allergies.    Past Medical History:   Diagnosis Date   • Heart murmur    • Idiopathic thrombocytopenic purpura (ITP) (HCC)    • OA (osteoarthritis)    • Spinal stenosis    • Umbilical hernia        Social History     Socioeconomic History   • Marital status: Single   Tobacco Use   • Smoking status: Former Smoker     Years: 10.00     Types: Cigarettes   • Smokeless tobacco: Never Used   Substance and Sexual Activity   • Alcohol use: No   • Drug use: No   • Sexual activity: Defer  "      Family History   Problem Relation Age of Onset   • Heart disease Mother    • Heart disease Father        Review of Systems   Constitutional: Positive for fatigue. Negative for chills and fever.   HENT: Positive for sinus pressure. Negative for congestion and sore throat.    Eyes: Positive for visual disturbance (glasses).   Respiratory: Negative.  Negative for chest tightness and shortness of breath.    Cardiovascular: Negative for chest pain, palpitations (reports from heart murmur) and leg swelling.   Gastrointestinal: Negative.  Negative for abdominal pain, blood in stool, constipation, diarrhea, nausea and vomiting.   Endocrine: Negative for cold intolerance and heat intolerance.   Genitourinary: Negative.  Negative for dysuria, frequency, hematuria and urgency.   Musculoskeletal: Positive for back pain (chronic) and neck pain (chronic).   Skin: Negative.  Negative for rash.   Allergic/Immunologic: Positive for environmental allergies. Negative for food allergies.   Neurological: Positive for dizziness. Negative for syncope and light-headedness.   Hematological: Bruises/bleeds easily.   Psychiatric/Behavioral: Negative for sleep disturbance (denies waking with soa or cp).       Objective   Vitals:    10/12/21 1112   BP: 127/80   BP Location: Left arm   Patient Position: Sitting   Pulse: 58   SpO2: 100%   Weight: 77.1 kg (170 lb)   Height: 170.2 cm (67\")      /80 (BP Location: Left arm, Patient Position: Sitting)   Pulse 58   Ht 170.2 cm (67\")   Wt 77.1 kg (170 lb)   SpO2 100%   BMI 26.63 kg/m²     Lab Results (most recent)     None          Physical Exam  Vitals and nursing note reviewed.   Constitutional:       General: He is not in acute distress.     Appearance: Normal appearance. He is well-developed.   HENT:      Head: Normocephalic and atraumatic.   Eyes:      General: No scleral icterus.        Right eye: No discharge.         Left eye: No discharge.      Conjunctiva/sclera: Conjunctivae " normal.   Neck:      Vascular: No carotid bruit.   Cardiovascular:      Rate and Rhythm: Regular rhythm. Bradycardia present.      Heart sounds: Normal heart sounds. No murmur heard.  No friction rub. No gallop.    Pulmonary:      Effort: Pulmonary effort is normal. No respiratory distress.      Breath sounds: Normal breath sounds. No wheezing or rales.   Chest:      Chest wall: No tenderness.   Musculoskeletal:      Right lower leg: No edema.      Left lower leg: No edema.   Skin:     General: Skin is warm and dry.      Coloration: Skin is not pale.      Findings: No erythema or rash.   Neurological:      Mental Status: He is alert and oriented to person, place, and time.      Cranial Nerves: No cranial nerve deficit.   Psychiatric:         Behavior: Behavior normal.         Procedure     ECG 12 Lead    Date/Time: 10/12/2021 11:20 AM  Performed by: Preston Farley PA  Authorized by: Preston Farley PA   Comparison: compared with previous ECG from 1/9/2020  Comments: EKG demonstrates sinus bradycardia 48 bpm with no acute ST changes               Assessment/Plan     Problems Addressed this Visit        Cardiac and Vasculature    Nonrheumatic tricuspid valve regurgitation    Bradycardia, sinus - Primary      Diagnoses       Codes Comments    Bradycardia, sinus    -  Primary ICD-10-CM: R00.1  ICD-9-CM: 427.89     Nonrheumatic tricuspid valve regurgitation     ICD-10-CM: I36.1  ICD-9-CM: 424.2           Recommendation  1.  Patient is a 40-year-old male that presents back to the office for follow-up.  Patient feels well with no ischemic symptoms.  For now we will monitor    2.  Patient has sinus bradycardia likely from conditioning.  He has no symptoms of cerebral hypoperfusion.  For now we will monitor    3.  Mild TR but otherwise no significant murmur on examination.  Nothing further from our standpoint we can see back in a year or as needed.  Follow-up with primary as scheduled           Patient brought in  medicine list to appointment, it's been reviewed with patient and med list was updated in the chart.       Electronically signed by:

## 2023-01-12 ENCOUNTER — OFFICE VISIT (OUTPATIENT)
Dept: CARDIOLOGY | Facility: CLINIC | Age: 43
End: 2023-01-12
Payer: COMMERCIAL

## 2023-01-12 VITALS
WEIGHT: 197.2 LBS | HEART RATE: 52 BPM | DIASTOLIC BLOOD PRESSURE: 86 MMHG | BODY MASS INDEX: 30.89 KG/M2 | SYSTOLIC BLOOD PRESSURE: 140 MMHG | OXYGEN SATURATION: 98 %

## 2023-01-12 DIAGNOSIS — R00.1 BRADYCARDIA, SINUS: Primary | ICD-10-CM

## 2023-01-12 DIAGNOSIS — I36.1 NONRHEUMATIC TRICUSPID VALVE REGURGITATION: ICD-10-CM

## 2023-01-12 DIAGNOSIS — I65.29 STENOSIS OF CAROTID ARTERY, UNSPECIFIED LATERALITY: ICD-10-CM

## 2023-01-12 PROCEDURE — 99213 OFFICE O/P EST LOW 20 MIN: CPT | Performed by: PHYSICIAN ASSISTANT

## 2023-01-12 RX ORDER — HYDROCODONE BITARTRATE AND ACETAMINOPHEN 10; 325 MG/1; MG/1
1 TABLET ORAL EVERY 6 HOURS PRN
COMMUNITY

## 2023-01-12 RX ORDER — METHOCARBAMOL 750 MG/1
750 TABLET, FILM COATED ORAL 4 TIMES DAILY PRN
COMMUNITY

## 2023-01-12 RX ORDER — GABAPENTIN 400 MG/1
400 CAPSULE ORAL 3 TIMES DAILY
COMMUNITY

## 2023-01-12 RX ORDER — CHOLECALCIFEROL (VITAMIN D3) 25 MCG
1000 TABLET ORAL DAILY
COMMUNITY
Start: 2022-12-21

## 2023-01-12 RX ORDER — BUPROPION HYDROCHLORIDE 150 MG/1
150 TABLET, EXTENDED RELEASE ORAL 2 TIMES DAILY
COMMUNITY
Start: 2022-12-21

## 2023-01-12 NOTE — PROGRESS NOTES
Problem list     Subjective   Dimitri Natarajan is a 42 y.o. male     Chief Complaint   Patient presents with   • Follow-up     YEARLY F/U        HPI    Patient is a 42-year-old male who presents back to the office for follow-up.    Patient has done well.  He does not describe any chest pain or pressure.  He has no complaints of dyspnea.  No PND orthopnea.    Patient does not palpitate or complain of dysrhythmic symptoms.  Overall, patient feels he is doing well.      Current Outpatient Medications on File Prior to Visit   Medication Sig Dispense Refill   • cetirizine (zyrTEC) 10 MG tablet Take 10 mg by mouth Daily.     • fluticasone (FLONASE) 50 MCG/ACT nasal spray 2 sprays into each nostril Daily.     • gabapentin (NEURONTIN) 400 MG capsule Take 400 mg by mouth 3 (Three) Times a Day.     • HYDROcodone-acetaminophen (NORCO)  MG per tablet Take 1 tablet by mouth Every 6 (Six) Hours As Needed for Moderate Pain.     • ibuprofen (ADVIL,MOTRIN) 800 MG tablet As Needed.     • methocarbamol (ROBAXIN) 750 MG tablet Take 750 mg by mouth 4 (Four) Times a Day As Needed for Muscle Spasms.     • buPROPion SR (WELLBUTRIN SR) 150 MG 12 hr tablet Take 150 mg by mouth 2 (Two) Times a Day.     • cholecalciferol 25 MCG tablet Take 1,000 Units by mouth Daily.     • gabapentin (NEURONTIN) 100 MG capsule Take 100 mg by mouth 3 (Three) Times a Day As Needed.     • HYDROcodone-acetaminophen (NORCO) 5-325 MG per tablet Take 1 tablet by mouth 4 (Four) Times a Day As Needed.     • methocarbamol (ROBAXIN) 500 MG tablet Take 750 mg by mouth 4 (Four) Times a Day.       No current facility-administered medications on file prior to visit.       Patient has no known allergies.    Past Medical History:   Diagnosis Date   • Heart murmur    • Idiopathic thrombocytopenic purpura (ITP) (HCC)    • OA (osteoarthritis)    • Spinal stenosis    • Umbilical hernia        Social History     Socioeconomic History   • Marital status: Single   Tobacco Use   •  Smoking status: Former     Years: 10.00     Types: Cigarettes   • Smokeless tobacco: Never   Substance and Sexual Activity   • Alcohol use: No   • Drug use: No   • Sexual activity: Defer       Family History   Problem Relation Age of Onset   • Heart disease Mother    • Heart disease Father        Review of Systems   Constitutional: Negative for appetite change, chills and fever.   HENT: Negative.  Negative for congestion, dental problem, drooling, ear discharge, facial swelling, postnasal drip, rhinorrhea, sinus pressure and sinus pain.    Eyes: Negative for pain, redness, itching and visual disturbance.   Respiratory: Negative for cough, choking, shortness of breath and wheezing.    Cardiovascular: Negative for chest pain, palpitations and leg swelling.   Gastrointestinal: Negative for blood in stool.   Genitourinary: Negative.  Negative for difficulty urinating.   Musculoskeletal: Positive for back pain. Arthralgias: chronic.   Neurological: Positive for dizziness (seldom). Negative for weakness and numbness.   Psychiatric/Behavioral: Positive for sleep disturbance.       Objective   Vitals:    01/12/23 1031   BP: 140/86   BP Location: Left arm   Patient Position: Sitting   Cuff Size: Adult   Pulse: 52   SpO2: 98%   Weight: 89.4 kg (197 lb 3.2 oz)      /86 (BP Location: Left arm, Patient Position: Sitting, Cuff Size: Adult)   Pulse 52   Wt 89.4 kg (197 lb 3.2 oz)   SpO2 98%   BMI 30.89 kg/m²     Lab Results (most recent)     None          Physical Exam  Vitals and nursing note reviewed.   Constitutional:       General: He is not in acute distress.     Appearance: Normal appearance. He is well-developed.   HENT:      Head: Normocephalic and atraumatic.   Eyes:      General: No scleral icterus.        Right eye: No discharge.         Left eye: No discharge.      Conjunctiva/sclera: Conjunctivae normal.   Neck:      Vascular: No carotid bruit.   Cardiovascular:      Rate and Rhythm: Normal rate and regular  rhythm.      Heart sounds: Normal heart sounds. No murmur heard.    No friction rub. No gallop.   Pulmonary:      Effort: Pulmonary effort is normal. No respiratory distress.      Breath sounds: Normal breath sounds. No wheezing or rales.   Chest:      Chest wall: No tenderness.   Musculoskeletal:      Right lower leg: No edema.      Left lower leg: No edema.   Skin:     General: Skin is warm and dry.      Coloration: Skin is not pale.      Findings: No erythema or rash.   Neurological:      Mental Status: He is alert and oriented to person, place, and time.      Cranial Nerves: No cranial nerve deficit.   Psychiatric:         Behavior: Behavior normal.         Procedure   Procedures       Assessment & Plan     Problems Addressed this Visit        Cardiac and Vasculature    Nonrheumatic tricuspid valve regurgitation    Relevant Orders    CBC & Differential    Comprehensive Metabolic Panel    Lipid Panel    TSH    Bradycardia, sinus - Primary    Relevant Orders    CBC & Differential    Comprehensive Metabolic Panel    Lipid Panel    TSH    Stenosis of carotid artery    Relevant Orders    CBC & Differential    Comprehensive Metabolic Panel    Lipid Panel    TSH   Diagnoses       Codes Comments    Bradycardia, sinus    -  Primary ICD-10-CM: R00.1  ICD-9-CM: 427.89     Stenosis of carotid artery, unspecified laterality     ICD-10-CM: I65.29  ICD-9-CM: 433.10     Nonrheumatic tricuspid valve regurgitation     ICD-10-CM: I36.1  ICD-9-CM: 424.2         Recommendation  1.  Patient is doing well with no angina or anginal quality symptoms.  He has bradycardia at times but does not describe any symptoms of cerebral hypoperfusion.  He otherwise is feeling well and we will monitor for now.    2.  Patient with nonobstructive carotid disease.  We will monitor for now.  No significant bruit on examination.  I do recommend close lipid monitoring and we would like to order laboratories.  Because of his nonobstructive carotid disease,  would like for him to be on statin therapy.  I will await lab results before instituting medication.    3.  Mild tricuspid regurgitation but no right-sided symptoms and otherwise doing well.  We will make no changes and otherwise see him for follow-up in a year.  Follow-up with primary as scheduled.             Dimitri Rosa Isela  reports that he has quit smoking. His smoking use included cigarettes. He has never used smokeless tobacco..                Electronically signed by:

## 2023-01-12 NOTE — LETTER
January 12, 2023     Shady Miller DO  [address]  [facility] [address]    Patient: Dimitri Natarajan   YOB: 1980   Date of Visit: 1/12/2023       Dear Shady Miller DO    Dimitri Natarajan was in my office today. Below is a copy of my note.    If you have questions, please do not hesitate to call me. I look forward to following Dimitri along with you.         Sincerely,        GABO Sullivan        CC: No Recipients    Problem list     Subjective    Dimitri Natarajan is a 42 y.o. male     Chief Complaint   Patient presents with   • Follow-up     YEARLY F/U        HPI    Patient is a 42-year-old male who presents back to the office for follow-up.    Patient has done well.  He does not describe any chest pain or pressure.  He has no complaints of dyspnea.  No PND orthopnea.    Patient does not palpitate or complain of dysrhythmic symptoms.  Overall, patient feels he is doing well.      Current Outpatient Medications on File Prior to Visit   Medication Sig Dispense Refill   • cetirizine (zyrTEC) 10 MG tablet Take 10 mg by mouth Daily.     • fluticasone (FLONASE) 50 MCG/ACT nasal spray 2 sprays into each nostril Daily.     • gabapentin (NEURONTIN) 400 MG capsule Take 400 mg by mouth 3 (Three) Times a Day.     • HYDROcodone-acetaminophen (NORCO)  MG per tablet Take 1 tablet by mouth Every 6 (Six) Hours As Needed for Moderate Pain.     • ibuprofen (ADVIL,MOTRIN) 800 MG tablet As Needed.     • methocarbamol (ROBAXIN) 750 MG tablet Take 750 mg by mouth 4 (Four) Times a Day As Needed for Muscle Spasms.     • buPROPion SR (WELLBUTRIN SR) 150 MG 12 hr tablet Take 150 mg by mouth 2 (Two) Times a Day.     • cholecalciferol 25 MCG tablet Take 1,000 Units by mouth Daily.     • gabapentin (NEURONTIN) 100 MG capsule Take 100 mg by mouth 3 (Three) Times a Day As Needed.     • HYDROcodone-acetaminophen (NORCO) 5-325 MG per tablet Take 1 tablet by mouth 4 (Four) Times a Day As Needed.     •  methocarbamol (ROBAXIN) 500 MG tablet Take 750 mg by mouth 4 (Four) Times a Day.       No current facility-administered medications on file prior to visit.       Patient has no known allergies.    Past Medical History:   Diagnosis Date   • Heart murmur    • Idiopathic thrombocytopenic purpura (ITP) (HCC)    • OA (osteoarthritis)    • Spinal stenosis    • Umbilical hernia        Social History     Socioeconomic History   • Marital status: Single   Tobacco Use   • Smoking status: Former     Years: 10.00     Types: Cigarettes   • Smokeless tobacco: Never   Substance and Sexual Activity   • Alcohol use: No   • Drug use: No   • Sexual activity: Defer       Family History   Problem Relation Age of Onset   • Heart disease Mother    • Heart disease Father        Review of Systems   Constitutional: Negative for appetite change, chills and fever.   HENT: Negative.  Negative for congestion, dental problem, drooling, ear discharge, facial swelling, postnasal drip, rhinorrhea, sinus pressure and sinus pain.    Eyes: Negative for pain, redness, itching and visual disturbance.   Respiratory: Negative for cough, choking, shortness of breath and wheezing.    Cardiovascular: Negative for chest pain, palpitations and leg swelling.   Gastrointestinal: Negative for blood in stool.   Genitourinary: Negative.  Negative for difficulty urinating.   Musculoskeletal: Positive for back pain. Arthralgias: chronic.   Neurological: Positive for dizziness (seldom). Negative for weakness and numbness.   Psychiatric/Behavioral: Positive for sleep disturbance.       Objective    Vitals:    01/12/23 1031   BP: 140/86   BP Location: Left arm   Patient Position: Sitting   Cuff Size: Adult   Pulse: 52   SpO2: 98%   Weight: 89.4 kg (197 lb 3.2 oz)      /86 (BP Location: Left arm, Patient Position: Sitting, Cuff Size: Adult)   Pulse 52   Wt 89.4 kg (197 lb 3.2 oz)   SpO2 98%   BMI 30.89 kg/m²     Lab Results (most recent)     None           Physical Exam  Vitals and nursing note reviewed.   Constitutional:       General: He is not in acute distress.     Appearance: Normal appearance. He is well-developed.   HENT:      Head: Normocephalic and atraumatic.   Eyes:      General: No scleral icterus.        Right eye: No discharge.         Left eye: No discharge.      Conjunctiva/sclera: Conjunctivae normal.   Neck:      Vascular: No carotid bruit.   Cardiovascular:      Rate and Rhythm: Normal rate and regular rhythm.      Heart sounds: Normal heart sounds. No murmur heard.    No friction rub. No gallop.   Pulmonary:      Effort: Pulmonary effort is normal. No respiratory distress.      Breath sounds: Normal breath sounds. No wheezing or rales.   Chest:      Chest wall: No tenderness.   Musculoskeletal:      Right lower leg: No edema.      Left lower leg: No edema.   Skin:     General: Skin is warm and dry.      Coloration: Skin is not pale.      Findings: No erythema or rash.   Neurological:      Mental Status: He is alert and oriented to person, place, and time.      Cranial Nerves: No cranial nerve deficit.   Psychiatric:         Behavior: Behavior normal.         Procedure    Procedures      Assessment & Plan     Problems Addressed this Visit        Cardiac and Vasculature    Nonrheumatic tricuspid valve regurgitation    Relevant Orders    CBC & Differential    Comprehensive Metabolic Panel    Lipid Panel    TSH    Bradycardia, sinus - Primary    Relevant Orders    CBC & Differential    Comprehensive Metabolic Panel    Lipid Panel    TSH    Stenosis of carotid artery    Relevant Orders    CBC & Differential    Comprehensive Metabolic Panel    Lipid Panel    TSH   Diagnoses       Codes Comments    Bradycardia, sinus    -  Primary ICD-10-CM: R00.1  ICD-9-CM: 427.89     Stenosis of carotid artery, unspecified laterality     ICD-10-CM: I65.29  ICD-9-CM: 433.10     Nonrheumatic tricuspid valve regurgitation     ICD-10-CM: I36.1  ICD-9-CM: 424.2          Recommendation  1.  Patient is doing well with no angina or anginal quality symptoms.  He has bradycardia at times but does not describe any symptoms of cerebral hypoperfusion.  He otherwise is feeling well and we will monitor for now.    2.  Patient with nonobstructive carotid disease.  We will monitor for now.  No significant bruit on examination.  I do recommend close lipid monitoring and we would like to order laboratories.  Because of his nonobstructive carotid disease, would like for him to be on statin therapy.  I will await lab results before instituting medication.    3.  Mild tricuspid regurgitation but no right-sided symptoms and otherwise doing well.  We will make no changes and otherwise see him for follow-up in a year.  Follow-up with primary as scheduled.            Dimitri Rosa Isela  reports that he has quit smoking. His smoking use included cigarettes. He has never used smokeless tobacco..                Electronically signed by:

## 2024-10-09 ENCOUNTER — OFFICE VISIT (OUTPATIENT)
Dept: CARDIOLOGY | Facility: CLINIC | Age: 44
End: 2024-10-09
Payer: COMMERCIAL

## 2024-10-09 ENCOUNTER — LAB (OUTPATIENT)
Dept: CARDIOLOGY | Facility: CLINIC | Age: 44
End: 2024-10-09
Payer: COMMERCIAL

## 2024-10-09 VITALS
BODY MASS INDEX: 33.43 KG/M2 | HEART RATE: 56 BPM | SYSTOLIC BLOOD PRESSURE: 130 MMHG | OXYGEN SATURATION: 97 % | WEIGHT: 213 LBS | DIASTOLIC BLOOD PRESSURE: 92 MMHG | HEIGHT: 67 IN

## 2024-10-09 DIAGNOSIS — R09.89 CAROTID BRUIT, UNSPECIFIED LATERALITY: ICD-10-CM

## 2024-10-09 DIAGNOSIS — I65.21 STENOSIS OF RIGHT CAROTID ARTERY: Primary | ICD-10-CM

## 2024-10-09 DIAGNOSIS — E78.00 PURE HYPERCHOLESTEROLEMIA: ICD-10-CM

## 2024-10-09 DIAGNOSIS — I65.21 STENOSIS OF RIGHT CAROTID ARTERY: ICD-10-CM

## 2024-10-09 LAB
ALBUMIN SERPL-MCNC: 4.5 G/DL (ref 3.5–5.2)
ALBUMIN/GLOB SERPL: 1.6 G/DL
ALP SERPL-CCNC: 63 U/L (ref 39–117)
ALT SERPL W P-5'-P-CCNC: 25 U/L (ref 1–41)
ANION GAP SERPL CALCULATED.3IONS-SCNC: 8.4 MMOL/L (ref 5–15)
AST SERPL-CCNC: 23 U/L (ref 1–40)
BASOPHILS # BLD AUTO: 0.04 10*3/MM3 (ref 0–0.2)
BASOPHILS NFR BLD AUTO: 0.9 % (ref 0–1.5)
BILIRUB SERPL-MCNC: 0.3 MG/DL (ref 0–1.2)
BUN SERPL-MCNC: 25 MG/DL (ref 6–20)
BUN/CREAT SERPL: 23.4 (ref 7–25)
CALCIUM SPEC-SCNC: 9.2 MG/DL (ref 8.6–10.5)
CHLORIDE SERPL-SCNC: 103 MMOL/L (ref 98–107)
CHOLEST SERPL-MCNC: 204 MG/DL (ref 0–200)
CO2 SERPL-SCNC: 27.6 MMOL/L (ref 22–29)
CREAT SERPL-MCNC: 1.07 MG/DL (ref 0.76–1.27)
DEPRECATED RDW RBC AUTO: 45.8 FL (ref 37–54)
EGFRCR SERPLBLD CKD-EPI 2021: 87.2 ML/MIN/1.73
EOSINOPHIL # BLD AUTO: 0.16 10*3/MM3 (ref 0–0.4)
EOSINOPHIL NFR BLD AUTO: 3.5 % (ref 0.3–6.2)
ERYTHROCYTE [DISTWIDTH] IN BLOOD BY AUTOMATED COUNT: 12.5 % (ref 12.3–15.4)
GLOBULIN UR ELPH-MCNC: 2.8 GM/DL
GLUCOSE SERPL-MCNC: 86 MG/DL (ref 65–99)
HCT VFR BLD AUTO: 43.4 % (ref 37.5–51)
HDLC SERPL-MCNC: 54 MG/DL (ref 40–60)
HGB BLD-MCNC: 14.5 G/DL (ref 13–17.7)
IMM GRANULOCYTES # BLD AUTO: 0 10*3/MM3 (ref 0–0.05)
IMM GRANULOCYTES NFR BLD AUTO: 0 % (ref 0–0.5)
LDLC SERPL CALC-MCNC: 125 MG/DL (ref 0–100)
LDLC/HDLC SERPL: 2.25 {RATIO}
LYMPHOCYTES # BLD AUTO: 1.82 10*3/MM3 (ref 0.7–3.1)
LYMPHOCYTES NFR BLD AUTO: 40 % (ref 19.6–45.3)
MCH RBC QN AUTO: 33 PG (ref 26.6–33)
MCHC RBC AUTO-ENTMCNC: 33.4 G/DL (ref 31.5–35.7)
MCV RBC AUTO: 98.9 FL (ref 79–97)
MONOCYTES # BLD AUTO: 0.38 10*3/MM3 (ref 0.1–0.9)
MONOCYTES NFR BLD AUTO: 8.4 % (ref 5–12)
NEUTROPHILS NFR BLD AUTO: 2.15 10*3/MM3 (ref 1.7–7)
NEUTROPHILS NFR BLD AUTO: 47.2 % (ref 42.7–76)
NRBC BLD AUTO-RTO: 0 /100 WBC (ref 0–0.2)
PLATELET # BLD AUTO: 136 10*3/MM3 (ref 140–450)
PMV BLD AUTO: 10 FL (ref 6–12)
POTASSIUM SERPL-SCNC: 4.4 MMOL/L (ref 3.5–5.2)
PROT SERPL-MCNC: 7.3 G/DL (ref 6–8.5)
RBC # BLD AUTO: 4.39 10*6/MM3 (ref 4.14–5.8)
SODIUM SERPL-SCNC: 139 MMOL/L (ref 136–145)
TRIGL SERPL-MCNC: 143 MG/DL (ref 0–150)
VLDLC SERPL-MCNC: 25 MG/DL (ref 5–40)
WBC NRBC COR # BLD AUTO: 4.55 10*3/MM3 (ref 3.4–10.8)

## 2024-10-09 PROCEDURE — 80061 LIPID PANEL: CPT | Performed by: PHYSICIAN ASSISTANT

## 2024-10-09 PROCEDURE — 85025 COMPLETE CBC W/AUTO DIFF WBC: CPT | Performed by: PHYSICIAN ASSISTANT

## 2024-10-09 PROCEDURE — 36415 COLL VENOUS BLD VENIPUNCTURE: CPT

## 2024-10-09 PROCEDURE — 93000 ELECTROCARDIOGRAM COMPLETE: CPT | Performed by: PHYSICIAN ASSISTANT

## 2024-10-09 PROCEDURE — 80053 COMPREHEN METABOLIC PANEL: CPT | Performed by: PHYSICIAN ASSISTANT

## 2024-10-09 PROCEDURE — 99214 OFFICE O/P EST MOD 30 MIN: CPT | Performed by: PHYSICIAN ASSISTANT

## 2024-10-09 RX ORDER — GABAPENTIN 300 MG/1
1 CAPSULE ORAL 3 TIMES DAILY
COMMUNITY
Start: 2024-09-27

## 2024-10-09 RX ORDER — MELOXICAM 15 MG/1
15 TABLET ORAL DAILY
COMMUNITY
Start: 2024-10-08

## 2024-10-09 RX ORDER — LIDOCAINE 50 MG/G
1 PATCH TOPICAL EVERY 24 HOURS
COMMUNITY
Start: 2024-09-25

## 2024-10-09 RX ORDER — PROPRANOLOL HCL 10 MG
10 TABLET ORAL 2 TIMES DAILY PRN
COMMUNITY
Start: 2024-09-25

## 2024-10-09 RX ORDER — BUPROPION HYDROCHLORIDE 200 MG/1
1 TABLET, EXTENDED RELEASE ORAL EVERY 12 HOURS SCHEDULED
COMMUNITY
Start: 2024-08-01

## 2024-10-09 NOTE — PROGRESS NOTES
Problem list     Subjective   Dimitri Natarajan is a 43 y.o. male     Chief Complaint   Patient presents with   • Follow-up     Yearly follow up        HPI    Patient is a 43-year-old male back to the office for yearly evaluation.  Patient has been evaluated in the past and had echocardiogram near 2017 with mild TR normal systolic function but otherwise largely benign findings.  Patient had carotid duplex showing mild disease in 2020.    He feels well.  He has no chest pain or shortness of breath.  No complaints of PND or orthopnea.      Patient does not describe palpitating nor does he complain of dysrhythmic symptoms.  He is stable otherwise.        Current Outpatient Medications on File Prior to Visit   Medication Sig Dispense Refill   • buPROPion SR (WELLBUTRIN SR) 200 MG 12 hr tablet Take 1 tablet by mouth Every 12 (Twelve) Hours.     • cetirizine (zyrTEC) 10 MG tablet Take 1 tablet by mouth Daily.     • cholecalciferol 25 MCG tablet Take 1 tablet by mouth Daily.     • fluticasone (FLONASE) 50 MCG/ACT nasal spray Administer 2 sprays into the nostril(s) as directed by provider Daily.     • gabapentin (NEURONTIN) 300 MG capsule Take 1 capsule by mouth 3 times a day.     • HYDROcodone-acetaminophen (NORCO)  MG per tablet Take 1 tablet by mouth Every 6 (Six) Hours As Needed for Moderate Pain.     • ibuprofen (ADVIL,MOTRIN) 800 MG tablet As Needed.     • lidocaine (LIDODERM) 5 % Place 1 patch on the skin as directed by provider Daily.     • meloxicam (MOBIC) 15 MG tablet Take 1 tablet by mouth Daily.     • methocarbamol (ROBAXIN) 750 MG tablet Take 1 tablet by mouth 4 (Four) Times a Day As Needed for Muscle Spasms.     • propranolol (INDERAL) 10 MG tablet Take 1 tablet by mouth 2 (Two) Times a Day As Needed.       No current facility-administered medications on file prior to visit.       Patient has no known allergies.    Past Medical History:   Diagnosis Date   • Heart murmur    • Idiopathic thrombocytopenic  "purpura (ITP)    • OA (osteoarthritis)    • Spinal stenosis    • Umbilical hernia        Social History     Socioeconomic History   • Marital status: Single   Tobacco Use   • Smoking status: Former     Current packs/day: 0.00     Types: Cigarettes     Quit date: 2005     Years since quittin.6   • Smokeless tobacco: Never   Substance and Sexual Activity   • Alcohol use: No   • Drug use: No   • Sexual activity: Not Currently     Partners: Female     Birth control/protection: Condom, I.U.D.       Family History   Problem Relation Age of Onset   • Heart disease Mother    • Asthma Mother    • Heart disease Father    • Asthma Father    • Hypertension Sister        Review of Systems   Constitutional:  Positive for fatigue. Negative for chills, diaphoresis and fever.   HENT: Negative.     Eyes: Negative.  Negative for visual disturbance.   Respiratory: Negative.  Negative for apnea, cough, chest tightness, shortness of breath and wheezing.    Cardiovascular: Negative.  Negative for chest pain, palpitations and leg swelling.   Gastrointestinal: Negative.  Negative for abdominal pain and blood in stool.   Endocrine: Negative.    Genitourinary: Negative.  Negative for hematuria.   Musculoskeletal:  Positive for arthralgias, back pain, myalgias, neck pain and neck stiffness.   Skin: Negative.  Negative for rash and wound.   Allergic/Immunologic: Negative.  Positive for environmental allergies (seasonal). Negative for food allergies.   Neurological:  Positive for dizziness, weakness, light-headedness and headaches. Negative for syncope and numbness.   Hematological: Negative.  Bruises/bleeds easily (bruises easily).   Psychiatric/Behavioral:  Negative for sleep disturbance.        Objective   Vitals:    10/09/24 1019   BP: 130/92   Pulse: 56   SpO2: 97%   Weight: 96.6 kg (213 lb)   Height: 170.2 cm (67\")      /92   Pulse 56   Ht 170.2 cm (67\")   Wt 96.6 kg (213 lb)   SpO2 97%   BMI 33.36 kg/m²     Lab Results " (most recent)       None            Physical Exam  Vitals and nursing note reviewed.   Constitutional:       General: He is not in acute distress.     Appearance: Normal appearance. He is well-developed.   HENT:      Head: Normocephalic and atraumatic.   Eyes:      General: No scleral icterus.        Right eye: No discharge.         Left eye: No discharge.      Conjunctiva/sclera: Conjunctivae normal.   Neck:      Vascular: Carotid bruit present.   Cardiovascular:      Rate and Rhythm: Normal rate and regular rhythm.      Heart sounds: Normal heart sounds. No murmur heard.     No friction rub. No gallop.      Comments: Faint systolic murmur noted right upper sternal border  Pulmonary:      Effort: Pulmonary effort is normal. No respiratory distress.      Breath sounds: Normal breath sounds. No wheezing or rales.   Chest:      Chest wall: No tenderness.   Musculoskeletal:      Right lower leg: No edema.      Left lower leg: No edema.   Skin:     General: Skin is warm and dry.      Coloration: Skin is not pale.      Findings: No erythema or rash.   Neurological:      Mental Status: He is alert and oriented to person, place, and time.      Cranial Nerves: No cranial nerve deficit.   Psychiatric:         Behavior: Behavior normal.       Procedure     ECG 12 Lead    Date/Time: 10/9/2024 10:22 AM  Performed by: Preston Farley PA    Authorized by: Preston Farley PA  Comparison: compared with previous ECG from 1/12/2023  Comparison to previous ECG: EKG demonstrates sinus bradycardia 51 bpm with first-degree AV block at 205 ms with no acute ST changes           Assessment & Plan     Problems Addressed this Visit          Cardiac and Vasculature    Stenosis of carotid artery - Primary    Relevant Orders    Duplex Carotid Ultrasound CAR    CBC & Differential (Completed)    Comprehensive Metabolic Panel (Completed)    Lipid Panel (Completed)    Carotid bruit    Relevant Orders    Duplex Carotid Ultrasound CAR    CBC &  Differential (Completed)    Comprehensive Metabolic Panel (Completed)    Lipid Panel (Completed)    Pure hypercholesterolemia    Relevant Orders    Duplex Carotid Ultrasound CAR    CBC & Differential (Completed)    Comprehensive Metabolic Panel (Completed)    Lipid Panel (Completed)     Diagnoses         Codes Comments    Stenosis of right carotid artery    -  Primary ICD-10-CM: I65.21  ICD-9-CM: 433.10     Carotid bruit, unspecified laterality     ICD-10-CM: R09.89  ICD-9-CM: 785.9     Pure hypercholesterolemia     ICD-10-CM: E78.00  ICD-9-CM: 272.0             Recommendations  1.  Patient is a 43-year-old male presenting back for routine follow-up.  Patient has done remarkably well.  Has no angina no anginal current symptoms.  No failure symptoms.  He does not complain of any palpitations.  For now, we can continue risk factor modification.    2.  We would like to set up for routine carotid duplex to evaluate because of bruit and known disease.    3.  Patient with dyslipidemia.  He is interested in potentially having labs done today which we will order.    4.  Otherwise, we can see him back for follow-up in a year or sooner if needed.  Follow-up with primary as scheduled.             Electronically signed by:

## 2024-10-09 NOTE — LETTER
October 10, 2024       No Recipients    Patient: Dimitri Natarajan   YOB: 1980   Date of Visit: 10/9/2024       Dear Shady Miller, DO    Dimitri Natarajan was in my office today. Below is a copy of my note.    If you have questions, please do not hesitate to call me. I look forward to following Dimitri along with you.         Sincerely,        GABO Sullivan        CC:   No Recipients    Problem list     Subjective  Dimitri Natarajan is a 43 y.o. male     Chief Complaint   Patient presents with   • Follow-up     Yearly follow up        HPI    Patient is a 43-year-old male back to the office for yearly evaluation.  Patient has been evaluated in the past and had echocardiogram near 2017 with mild TR normal systolic function but otherwise largely benign findings.  Patient had carotid duplex showing mild disease in 2020.    He feels well.  He has no chest pain or shortness of breath.  No complaints of PND or orthopnea.      Patient does not describe palpitating nor does he complain of dysrhythmic symptoms.  He is stable otherwise.        Current Outpatient Medications on File Prior to Visit   Medication Sig Dispense Refill   • buPROPion SR (WELLBUTRIN SR) 200 MG 12 hr tablet Take 1 tablet by mouth Every 12 (Twelve) Hours.     • cetirizine (zyrTEC) 10 MG tablet Take 1 tablet by mouth Daily.     • cholecalciferol 25 MCG tablet Take 1 tablet by mouth Daily.     • fluticasone (FLONASE) 50 MCG/ACT nasal spray Administer 2 sprays into the nostril(s) as directed by provider Daily.     • gabapentin (NEURONTIN) 300 MG capsule Take 1 capsule by mouth 3 times a day.     • HYDROcodone-acetaminophen (NORCO)  MG per tablet Take 1 tablet by mouth Every 6 (Six) Hours As Needed for Moderate Pain.     • ibuprofen (ADVIL,MOTRIN) 800 MG tablet As Needed.     • lidocaine (LIDODERM) 5 % Place 1 patch on the skin as directed by provider Daily.     • meloxicam (MOBIC) 15 MG tablet Take 1 tablet by mouth Daily.     •  methocarbamol (ROBAXIN) 750 MG tablet Take 1 tablet by mouth 4 (Four) Times a Day As Needed for Muscle Spasms.     • propranolol (INDERAL) 10 MG tablet Take 1 tablet by mouth 2 (Two) Times a Day As Needed.       No current facility-administered medications on file prior to visit.       Patient has no known allergies.    Past Medical History:   Diagnosis Date   • Heart murmur    • Idiopathic thrombocytopenic purpura (ITP)    • OA (osteoarthritis)    • Spinal stenosis    • Umbilical hernia        Social History     Socioeconomic History   • Marital status: Single   Tobacco Use   • Smoking status: Former     Current packs/day: 0.00     Types: Cigarettes     Quit date: 2005     Years since quittin.6   • Smokeless tobacco: Never   Substance and Sexual Activity   • Alcohol use: No   • Drug use: No   • Sexual activity: Not Currently     Partners: Female     Birth control/protection: Condom, I.U.D.       Family History   Problem Relation Age of Onset   • Heart disease Mother    • Asthma Mother    • Heart disease Father    • Asthma Father    • Hypertension Sister        Review of Systems   Constitutional:  Positive for fatigue. Negative for chills, diaphoresis and fever.   HENT: Negative.     Eyes: Negative.  Negative for visual disturbance.   Respiratory: Negative.  Negative for apnea, cough, chest tightness, shortness of breath and wheezing.    Cardiovascular: Negative.  Negative for chest pain, palpitations and leg swelling.   Gastrointestinal: Negative.  Negative for abdominal pain and blood in stool.   Endocrine: Negative.    Genitourinary: Negative.  Negative for hematuria.   Musculoskeletal:  Positive for arthralgias, back pain, myalgias, neck pain and neck stiffness.   Skin: Negative.  Negative for rash and wound.   Allergic/Immunologic: Negative.  Positive for environmental allergies (seasonal). Negative for food allergies.   Neurological:  Positive for dizziness, weakness, light-headedness and headaches.  "Negative for syncope and numbness.   Hematological: Negative.  Bruises/bleeds easily (bruises easily).   Psychiatric/Behavioral:  Negative for sleep disturbance.        Objective  Vitals:    10/09/24 1019   BP: 130/92   Pulse: 56   SpO2: 97%   Weight: 96.6 kg (213 lb)   Height: 170.2 cm (67\")      /92   Pulse 56   Ht 170.2 cm (67\")   Wt 96.6 kg (213 lb)   SpO2 97%   BMI 33.36 kg/m²     Lab Results (most recent)       None            Physical Exam  Vitals and nursing note reviewed.   Constitutional:       General: He is not in acute distress.     Appearance: Normal appearance. He is well-developed.   HENT:      Head: Normocephalic and atraumatic.   Eyes:      General: No scleral icterus.        Right eye: No discharge.         Left eye: No discharge.      Conjunctiva/sclera: Conjunctivae normal.   Neck:      Vascular: Carotid bruit present.   Cardiovascular:      Rate and Rhythm: Normal rate and regular rhythm.      Heart sounds: Normal heart sounds. No murmur heard.     No friction rub. No gallop.      Comments: Faint systolic murmur noted right upper sternal border  Pulmonary:      Effort: Pulmonary effort is normal. No respiratory distress.      Breath sounds: Normal breath sounds. No wheezing or rales.   Chest:      Chest wall: No tenderness.   Musculoskeletal:      Right lower leg: No edema.      Left lower leg: No edema.   Skin:     General: Skin is warm and dry.      Coloration: Skin is not pale.      Findings: No erythema or rash.   Neurological:      Mental Status: He is alert and oriented to person, place, and time.      Cranial Nerves: No cranial nerve deficit.   Psychiatric:         Behavior: Behavior normal.       Procedure    ECG 12 Lead    Date/Time: 10/9/2024 10:22 AM  Performed by: Preston Farley PA    Authorized by: Preston Farley PA  Comparison: compared with previous ECG from 1/12/2023  Comparison to previous ECG: EKG demonstrates sinus bradycardia 51 bpm with first-degree AV " block at 205 ms with no acute ST changes           Assessment & Plan    Problems Addressed this Visit          Cardiac and Vasculature    Stenosis of carotid artery - Primary    Relevant Orders    Duplex Carotid Ultrasound CAR    CBC & Differential (Completed)    Comprehensive Metabolic Panel (Completed)    Lipid Panel (Completed)    Carotid bruit    Relevant Orders    Duplex Carotid Ultrasound CAR    CBC & Differential (Completed)    Comprehensive Metabolic Panel (Completed)    Lipid Panel (Completed)    Pure hypercholesterolemia    Relevant Orders    Duplex Carotid Ultrasound CAR    CBC & Differential (Completed)    Comprehensive Metabolic Panel (Completed)    Lipid Panel (Completed)     Diagnoses         Codes Comments    Stenosis of right carotid artery    -  Primary ICD-10-CM: I65.21  ICD-9-CM: 433.10     Carotid bruit, unspecified laterality     ICD-10-CM: R09.89  ICD-9-CM: 785.9     Pure hypercholesterolemia     ICD-10-CM: E78.00  ICD-9-CM: 272.0             Recommendations  1.  Patient is a 43-year-old male presenting back for routine follow-up.  Patient has done remarkably well.  Has no angina no anginal current symptoms.  No failure symptoms.  He does not complain of any palpitations.  For now, we can continue risk factor modification.    2.  We would like to set up for routine carotid duplex to evaluate because of bruit and known disease.    3.  Patient with dyslipidemia.  He is interested in potentially having labs done today which we will order.    4.  Otherwise, we can see him back for follow-up in a year or sooner if needed.  Follow-up with primary as scheduled.             Electronically signed by:

## 2024-10-10 ENCOUNTER — TELEPHONE (OUTPATIENT)
Dept: CARDIOLOGY | Facility: CLINIC | Age: 44
End: 2024-10-10
Payer: COMMERCIAL

## 2024-10-10 DIAGNOSIS — E78.00 PURE HYPERCHOLESTEROLEMIA: Primary | ICD-10-CM

## 2024-10-10 RX ORDER — ATORVASTATIN CALCIUM 20 MG/1
20 TABLET, FILM COATED ORAL DAILY
Qty: 30 TABLET | Refills: 11 | Status: SHIPPED | OUTPATIENT
Start: 2024-10-10

## 2024-10-10 NOTE — TELEPHONE ENCOUNTER
----- Message from Preston Farley sent at 10/10/2024 11:50 AM EDT -----  Atorvastatin 20 mg and repeat CMP and lipid in 6 to 8 weeks      Called patient with result's and recommendation's, patient prefers to try to bring cholesterol down with diet and exercise but request that rx be sent in just in case.  States its not that hard has done it before.     Rx submitted to pharmacy lab orders entered.

## 2024-11-05 ENCOUNTER — HOSPITAL ENCOUNTER (OUTPATIENT)
Dept: CARDIOLOGY | Facility: HOSPITAL | Age: 44
Discharge: HOME | End: 2024-11-05
Admitting: PHYSICIAN ASSISTANT
Payer: COMMERCIAL

## 2024-11-05 DIAGNOSIS — E78.00 PURE HYPERCHOLESTEROLEMIA: ICD-10-CM

## 2024-11-05 DIAGNOSIS — R09.89 CAROTID BRUIT, UNSPECIFIED LATERALITY: ICD-10-CM

## 2024-11-05 DIAGNOSIS — I65.21 STENOSIS OF RIGHT CAROTID ARTERY: ICD-10-CM

## 2024-11-05 LAB
BH CV XLRA MEAS LEFT CAROTID BULB EDV: -22 CM/SEC
BH CV XLRA MEAS LEFT CAROTID BULB PSV: -81.5 CM/SEC
BH CV XLRA MEAS LEFT DIST CCA EDV: -29.8 CM/SEC
BH CV XLRA MEAS LEFT DIST CCA PSV: -111 CM/SEC
BH CV XLRA MEAS LEFT DIST ICA EDV: -40.6 CM/SEC
BH CV XLRA MEAS LEFT DIST ICA PSV: -118 CM/SEC
BH CV XLRA MEAS LEFT ICA/CCA RATIO: 1.1
BH CV XLRA MEAS LEFT MID ICA EDV: -47 CM/SEC
BH CV XLRA MEAS LEFT MID ICA PSV: -111 CM/SEC
BH CV XLRA MEAS LEFT PROX CCA EDV: 25.9 CM/SEC
BH CV XLRA MEAS LEFT PROX CCA PSV: 126 CM/SEC
BH CV XLRA MEAS LEFT PROX ECA PSV: -99.6 CM/SEC
BH CV XLRA MEAS LEFT PROX ICA EDV: -34.7 CM/SEC
BH CV XLRA MEAS LEFT PROX ICA PSV: -101 CM/SEC
BH CV XLRA MEAS LEFT VERTEBRAL A EDV: -9.3 CM/SEC
BH CV XLRA MEAS LEFT VERTEBRAL A PSV: -37.3 CM/SEC
BH CV XLRA MEAS RIGHT CAROTID BULB EDV: -19.6 CM/SEC
BH CV XLRA MEAS RIGHT CAROTID BULB PSV: -82.3 CM/SEC
BH CV XLRA MEAS RIGHT DIST CCA EDV: -19.6 CM/SEC
BH CV XLRA MEAS RIGHT DIST CCA PSV: -92.5 CM/SEC
BH CV XLRA MEAS RIGHT DIST ICA EDV: -36.8 CM/SEC
BH CV XLRA MEAS RIGHT DIST ICA PSV: -96.4 CM/SEC
BH CV XLRA MEAS RIGHT ICA/CCA RATIO: 1
BH CV XLRA MEAS RIGHT MID ICA EDV: -30.6 CM/SEC
BH CV XLRA MEAS RIGHT MID ICA PSV: -81.5 CM/SEC
BH CV XLRA MEAS RIGHT PROX CCA EDV: 20.9 CM/SEC
BH CV XLRA MEAS RIGHT PROX CCA PSV: 145 CM/SEC
BH CV XLRA MEAS RIGHT PROX ECA PSV: -131 CM/SEC
BH CV XLRA MEAS RIGHT PROX ICA EDV: -26.7 CM/SEC
BH CV XLRA MEAS RIGHT PROX ICA PSV: -90.9 CM/SEC
BH CV XLRA MEAS RIGHT VERTEBRAL A EDV: 16.5 CM/SEC
BH CV XLRA MEAS RIGHT VERTEBRAL A PSV: 58.8 CM/SEC

## 2024-11-05 PROCEDURE — 93880 EXTRACRANIAL BILAT STUDY: CPT

## 2024-11-26 ENCOUNTER — TELEPHONE (OUTPATIENT)
Dept: CARDIOLOGY | Facility: CLINIC | Age: 44
End: 2024-11-26
Payer: COMMERCIAL

## 2024-11-26 NOTE — TELEPHONE ENCOUNTER
US CAROTID  Pt notified of no acute findings. Provider will discuss results at f/u. Pt reminded of appt date and time.  ----- Message from Preston Farley sent at 11/25/2024 10:47 AM EST -----  Routine follow-up

## 2025-02-20 ENCOUNTER — TELEPHONE (OUTPATIENT)
Dept: CARDIOLOGY | Facility: CLINIC | Age: 45
End: 2025-02-20
Payer: COMMERCIAL

## 2025-02-20 NOTE — TELEPHONE ENCOUNTER
LABS  Pt notified of no acute findings. Provider will discuss results at f/u. Pt reminded of appt date and time.  ----- Message from Christina RENNER sent at 2/17/2025  3:15 PM EST -----    ----- Message -----  From: Preston Farley PA  Sent: 2/17/2025   1:13 PM EST  To: Christina Gusman MA    Let patient know that his labs look good.  Fax results to PCP

## 2025-08-04 RX ORDER — ATORVASTATIN CALCIUM 20 MG/1
20 TABLET, FILM COATED ORAL DAILY
Qty: 90 TABLET | Refills: 3 | Status: SHIPPED | OUTPATIENT
Start: 2025-08-04